# Patient Record
(demographics unavailable — no encounter records)

---

## 2025-01-02 NOTE — PROCEDURE
[Large Joint Injection] : Large joint injection [Left] : of the left [Knee] : knee [Pain] : pain [Inflammation] : inflammation [Alcohol] : alcohol [Betadine] : betadine [Ethyl Chloride sprayed topically] : ethyl chloride sprayed topically [Sterile technique used] : sterile technique used [___ cc    6mg] :  Betamethasone (Celestone) ~Vcc of 6mg [___ cc    0.5%] : Bupivacaine (Marcaine) ~Vcc of 0.5%  [] : Patient tolerated procedure well [Call if redness, pain or fever occur] : call if redness, pain or fever occur [Apply ice for 15min out of every hour for the next 12-24 hours as tolerated] : apply ice for 15 minutes out of every hour for the next 12-24 hours as tolerated [Patient was advised to rest the joint(s) for ____ days] : patient was advised to rest the joint(s) for [unfilled] days [Previous OTC use and PT nontherapeutic] : patient has tried OTC's including aspirin, Ibuprofen, Aleve, etc or prescription NSAIDS, and/or exercises at home and/or physical therapy without satisfactory response [Patient had decreased mobility in the joint] : patient had decreased mobility in the joint [Risks, benefits, alternatives discussed / Verbal consent obtained] : the risks benefits, and alternatives have been discussed, and verbal consent was obtained

## 2025-01-02 NOTE — HISTORY OF PRESENT ILLNESS
[5] : 5 [4] : 4 [Dull/Aching] : dull/aching [Constant] : constant [Nothing helps with pain getting better] : Nothing helps with pain getting better [de-identified] :  01/02/2025: Known left knee osteoarthritis.  He did well with cortisone injection 4/24/2023.  He notes over the last several months medially based left knee pain has returned exacerbated with start up and down the stairs.  4-24-23- 3 weeks h/o atraumatic med and laterally based left knee pain. pain is constant worse with startup and stairs. He has tried otc nsaids mild help  works as a  on his feet  [] : no [FreeTextEntry5] : Started 3 weeks ago. Pt denies injury. Pt has a constant dull ache

## 2025-01-02 NOTE — ASSESSMENT
[FreeTextEntry1] : For acute pain will administer cortisone injection today to the left knee Discussed the role of possible HA injections if symptoms persist

## 2025-01-02 NOTE — PHYSICAL EXAM
[Left] : left knee [NL (0)] : extension 0 degrees [5___] : hamstring 5[unfilled]/5 [Equivocal] : equivocal Kenroy [] : mildly antalgic [TWNoteComboBox7] : flexion 115 degrees

## 2025-02-05 NOTE — HISTORY OF PRESENT ILLNESS
[FreeTextEntry1] : Mr. ESPOSITO is a 63 year-old White  M with left ureteral/kidney stones s/p left urs + LL January 2024.  Stone analysis calcium oxalate. Denies new events of flank pain or stone passage.  Reports compliance with kidney stone diet. No history of stones in the past. No family history of PCa.  Denies fevers, chills, LUTS, hematuria, AUR.

## 2025-02-05 NOTE — ASSESSMENT
[FreeTextEntry1] : Mr. ESPOSITO is a 63 year-old White  M with left ureteral/kidney stones s/p left urs + LL January 2024.  Stone analysis calcium oxalate.  Doing well.  We discussed follow-up imaging and PSA screening.  The patient and I spoke at length about prostate cancer screening, its risks and its benefits. The patient has no risk factors for prostate cancer.  Prostate cancer is the most common solid tumor in American men. However, we discussed that it can be very slow growing, many men with prostate cancer will die with the disease rather than from it. He also understands that PSA in and of itself does not diagnose prostate cancer but only assesses risk to a certain degree. The patient understands that to definitively screen for prostate cancer, a biopsy is required and this procedure has risks, including bleeding, infection, ED and urinary retention. The patient opted to do PSA screening.

## 2025-02-05 NOTE — SIGNATURES
Discharge orders noted. Additional clinical references attached.    Patient's discharge instructions given by bedside RN and reviewed via this VN.  Education provided on new medication, diagnosis, and follow-up appointments.  Teach back method used. Patient verbalized understanding. All questions answered. Transport to [facility] requested. Floor nurse notified.      07/26/22 0936   AVS Confirmation   Discharge instructions and AVS given to and reviewed with patient and/or significant other. Yes      [TextEntry] : Logan Tatum M.D. Minimally Invasive and Robotic Urologic Surgery [facility] | [facility]  of Urology [employer] at [facility] Tel: [phone] | Fax: [phone] | email: [email or web address]

## 2025-02-05 NOTE — SIGNATURES
[TextEntry] : Logan Ttaum M.D. Minimally Invasive and Robotic Urologic Surgery [facility] for [facility] | [facility]  of Urology [employer] at [facility] Tel: [phone] | Fax: [phone] | email: [email or web address]

## 2025-03-07 NOTE — HISTORY OF PRESENT ILLNESS
[Home] : at home, [unfilled] , at the time of the visit. [Medical Office: ([facility])___] : at the medical office located in  [Telehealth (audio & video)] : This visit was provided via telehealth using real-time 2-way audio visual technology. [Verbal consent obtained from patient] : the patient, [unfilled] [FreeTextEntry1] : Mr. ESPOSITO is a 64 year-old White  M with left ureteral/kidney stones s/p left urs + LL January 2024.  Stone analysis calcium oxalate. Denies new events of flank pain or stone passage.  Reports compliance with kidney stone diet.  Elevated PSA 4.44 February 10, 2025 with free PSA 13%. MRI 3/1/2025: Prostate 31 cc, PI-RADS 2, PSA density 0.14  No history of stones in the past. No family history of PCa.  Denies fevers, chills, LUTS, hematuria, AUR.

## 2025-03-07 NOTE — HISTORY OF PRESENT ILLNESS
[Home] : at home, [unfilled] , at the time of the visit. [Medical Office: ([facility])___] : at the medical office located in  [Telehealth (audio & video)] : This visit was provided via telehealth using real-time 2-way audio visual technology. [Verbal consent obtained from patient] : the patient, [unfilled] [FreeTextEntry1] : Mr. ESPOSITO is a 64 year-old White  M with left ureteral/kidney stones s/p left urs + LL January 2024.  Stone analysis calcium oxalate. Denies new events of flank pain or stone passage.  Reports compliance with kidney stone diet.  Elevated PSA 4.44 February 10, 2025 with free PSA 13%. MRI 3/1/2025: Prostate 31 cc, PI-RADS 2, PSA density 0.14  No history of stones in the past. No family history of PCa.  Denies fevers, chills, LUTS, hematuria, AUR.

## 2025-03-07 NOTE — PHYSICAL EXAM
[Normal Appearance] : normal appearance [Well Groomed] : well groomed [General Appearance - In No Acute Distress] : no acute distress [] : no respiratory distress [Respiration, Rhythm And Depth] : normal respiratory rhythm and effort [Exaggerated Use Of Accessory Muscles For Inspiration] : no accessory muscle use [Oriented To Time, Place, And Person] : oriented to person, place, and time [Affect] : the affect was normal [Mood] : the mood was normal

## 2025-03-07 NOTE — ASSESSMENT
[FreeTextEntry1] : Mr. ESPOSITO is a 64 year-old White  M with left ureteral/kidney stones s/p left urs + LL January 2024.  Stone analysis calcium oxalate.  We discussed kidney stone prevention.  Follow-up imaging 6 months.  Elevated PSA 4.44 February 10, 2025 with free PSA 13%. MRI 3/1/2025: Prostate 31 cc, PI-RADS 2, PSA density 0.14 We discussed at length the significance of PSA as well as increased risk with high of elevated PSA, high PSA density and low free PSA.  I gave the patient the following options: 1. Do nothing. This could miss an undiagnosed prostate cancer which may have little effect or significant effects at a later date. 2. Recheck his PSA in 3-6 months. We would then regroup and have this discussion about his options again. 3. Perform a prostate biopsy: I explained how a Transperineal targeted prostate biopsy is an office-based procedure (or at an outpatient facility) under general anesthesia. It is performed under TRUS and magnetic resonance imaging (MRI) guidance. Complications include but are not limited to hematuria, Hematospermia, perineal hematoma, prostatitis, difficulty voiding.   Based on our discussion the patient decided to proceed repeat PSA in 3 months.

## 2025-03-07 NOTE — SIGNATURES
[TextEntry] : Logan Tatum M.D. Minimally Invasive and Robotic Urologic Surgery [facility] for [facility] | [facility]  of Urology [employer] at [facility] Tel: [phone] | Fax: [phone] | email: [email or web address]

## 2025-03-07 NOTE — SIGNATURES
[TextEntry] : Logan Tatum M.D. Minimally Invasive and Robotic Urologic Surgery [facility] | [facility]  of Urology [employer] at [facility] Tel: [phone] | Fax: [phone] | email: [email or web address]

## 2025-06-27 NOTE — ASSESSMENT
[FreeTextEntry1] : Mr. ESPOSITO is a 65-year-old M   Kidney stones--status post left ureteral/kidney stones s/p left urs + LL January 2024.  Stone analysis calcium oxalate.  No new events.  We discussed kidney stone prevention and follow-up imaging.  Elevated PSA--PSA density 0.14, MRI PI-RADS 2.  We discussed need for prostate biopsy if uptrending PSA, patient wishes to obtain PSA today and make decision based on results.   We discussed at length the significance of PSA as well as increased risk with high of elevated PSA, high PSA density and low free PSA.

## 2025-06-27 NOTE — PHYSICAL EXAM
[Normal Appearance] : normal appearance [Well Groomed] : well groomed [General Appearance - In No Acute Distress] : no acute distress [] : no respiratory distress [Respiration, Rhythm And Depth] : normal respiratory rhythm and effort [Exaggerated Use Of Accessory Muscles For Inspiration] : no accessory muscle use [Oriented To Time, Place, And Person] : oriented to person, place, and time [Affect] : the affect was normal [Mood] : the mood was normal [Normal Station and Gait] : the gait and station were normal for the patient's age [Skin Color & Pigmentation] : normal skin color and pigmentation [No Focal Deficits] : no focal deficits

## 2025-06-27 NOTE — SIGNATURES
[TextEntry] : Logan Tatum M.D. Minimally Invasive and Robotic Urologic Surgery [facility] | [facility]  of Urology [employer] at [facility] Tel: [phone] | Fax: [phone] | email: [email or web address]

## 2025-06-27 NOTE — HISTORY OF PRESENT ILLNESS
[Home] : at home, [unfilled] , at the time of the visit. [Medical Office: ([facility])___] : at the medical office located in  [Telehealth (audio & video)] : This visit was provided via telehealth using real-time 2-way audio visual technology. [Verbal consent obtained from patient] : the patient, [unfilled] [FreeTextEntry1] : Mr. ESPOSITO is a 65-year-old M with left ureteral/kidney stones s/p left urs + LL January 2024.  Stone analysis calcium oxalate. Denies new events of flank pain or stone passage.  Reports compliance with kidney stone diet.  Elevated PSA 4.44 February 10, 2025 with free PSA 13%. MRI 3/1/2025: Prostate 31 cc, PI-RADS 2, PSA density 0.14  No history of stones in the past. No family history of PCa.  Denies fevers, chills, LUTS, hematuria, AUR.